# Patient Record
Sex: MALE
[De-identification: names, ages, dates, MRNs, and addresses within clinical notes are randomized per-mention and may not be internally consistent; named-entity substitution may affect disease eponyms.]

---

## 2020-03-11 ENCOUNTER — HOSPITAL ENCOUNTER (OUTPATIENT)
Dept: HOSPITAL 90 - RESP | Age: 70
Discharge: HOME | End: 2020-03-11
Attending: INTERNAL MEDICINE
Payer: COMMERCIAL

## 2020-03-11 DIAGNOSIS — R06.09: Primary | ICD-10-CM

## 2020-03-11 PROCEDURE — 94729 DIFFUSING CAPACITY: CPT

## 2020-03-11 PROCEDURE — 94060 EVALUATION OF WHEEZING: CPT

## 2020-03-11 PROCEDURE — 94727 GAS DIL/WSHOT DETER LNG VOL: CPT

## 2020-04-09 ENCOUNTER — HOSPITAL ENCOUNTER (OUTPATIENT)
Dept: HOSPITAL 90 - OIH | Age: 70
Discharge: HOME | End: 2020-04-09
Attending: INTERNAL MEDICINE
Payer: COMMERCIAL

## 2020-04-09 DIAGNOSIS — Z13.6: Primary | ICD-10-CM

## 2020-04-09 PROCEDURE — 75571 CT HRT W/O DYE W/CA TEST: CPT

## 2022-08-04 ENCOUNTER — HOSPITAL ENCOUNTER (OUTPATIENT)
Dept: HOSPITAL 90 - SHCH | Age: 72
Discharge: HOME | End: 2022-08-04
Attending: INTERNAL MEDICINE
Payer: COMMERCIAL

## 2022-08-04 DIAGNOSIS — I27.0: Primary | ICD-10-CM

## 2022-08-04 PROCEDURE — 93306 TTE W/DOPPLER COMPLETE: CPT

## 2024-11-22 ENCOUNTER — HOSPITAL ENCOUNTER (OUTPATIENT)
Dept: HOSPITAL 90 - RAH | Age: 74
Discharge: HOME | End: 2024-11-22
Attending: OTOLARYNGOLOGY
Payer: COMMERCIAL

## 2024-11-22 DIAGNOSIS — M47.815: ICD-10-CM

## 2024-11-22 DIAGNOSIS — R49.0: Primary | ICD-10-CM

## 2024-11-22 PROCEDURE — 71046 X-RAY EXAM CHEST 2 VIEWS: CPT

## 2024-12-02 ENCOUNTER — HOSPITAL ENCOUNTER (OUTPATIENT)
Dept: HOSPITAL 90 - LAB | Age: 74
Discharge: HOME | End: 2024-12-02
Attending: OTOLARYNGOLOGY
Payer: COMMERCIAL

## 2024-12-02 DIAGNOSIS — R49.0: Primary | ICD-10-CM

## 2024-12-02 LAB
BUN SERPL-MCNC: 27 MG/DL (ref 7–18)
CREAT SERPL-MCNC: 1.2 MG/DL (ref 0.5–1.3)
GFR SERPL CREATININE-BSD FRML MDRD: 63 ML/MIN (ref 90–?)

## 2024-12-02 PROCEDURE — 36415 COLL VENOUS BLD VENIPUNCTURE: CPT

## 2024-12-02 PROCEDURE — 84520 ASSAY OF UREA NITROGEN: CPT

## 2024-12-02 PROCEDURE — 82565 ASSAY OF CREATININE: CPT

## 2024-12-05 ENCOUNTER — HOSPITAL ENCOUNTER (OUTPATIENT)
Dept: HOSPITAL 90 - RAH | Age: 74
Discharge: HOME | End: 2024-12-05
Attending: OTOLARYNGOLOGY
Payer: COMMERCIAL

## 2024-12-05 DIAGNOSIS — G31.89: ICD-10-CM

## 2024-12-05 DIAGNOSIS — R49.0: ICD-10-CM

## 2024-12-05 DIAGNOSIS — E04.1: Primary | ICD-10-CM

## 2024-12-05 PROCEDURE — 70470 CT HEAD/BRAIN W/O & W/DYE: CPT

## 2024-12-05 PROCEDURE — 76536 US EXAM OF HEAD AND NECK: CPT

## 2024-12-05 NOTE — [facility]
CT HEAD/BRAIN W/WO CONTRAST



HISTORY: Voice and resonance disorder



COMPARISON: None



TECHNIQUE: Multiple sequential axial images of the head were obtained

from the base of the skull through vertex. Patient was not given

contrast through intravenous route. 



FINDINGS: The ventricles and extraventricular CSF spaces are dilated

consistent with cerebral atrophy. Nonspecific white matter changes seen.

 There is no midline shift, mass effect or herniation.  No acute

intracranial bleed is seen.  Visualized portion of the paranasal sinuses

are grossly within normal limits.



IMPRESSION: 

1. No acute intracranial bleed is seen.

2. Atrophy with white matter changes.









CT was performed with one or more following dose reduction techniques:

automated exposure control, adjustment of the mA and kv according to

patient's size, or use of a iterative reconstruction technique.

## 2024-12-05 NOTE — [facility]
US THYROID/NECK



HISTORY: Dyspnea



COMPARISON: None



TECHNIQUE: Thyroid ultrasound study was performed.



FINDINGS: Right thyroid lobe measures 4.5 x 1.2 x 2.1 cm. Left thyroid

lobe measures 3.9 x 1.5 x 1.6 cm. No discrete thyroid nodule is seen.

There is left midpole thyroid cyst measuring 4 x 2 x 3 mm.



IMPRESSION: 

1. No discrete thyroid nodule is seen. Left mid pole thyroid cyst

measuring 4 x 2 x 3 mm.

## 2025-01-30 ENCOUNTER — HOSPITAL ENCOUNTER (OUTPATIENT)
Dept: HOSPITAL 90 - LAB | Age: 75
Discharge: HOME | End: 2025-01-30
Attending: OTOLARYNGOLOGY
Payer: COMMERCIAL

## 2025-01-30 DIAGNOSIS — R94.4: ICD-10-CM

## 2025-01-30 DIAGNOSIS — Z13.228: Primary | ICD-10-CM

## 2025-01-30 LAB
BUN SERPL-MCNC: 24 MG/DL (ref 7–18)
CHLORIDE SERPL-SCNC: 103 MMOL/L (ref 101–111)
CO2 SERPL-SCNC: 31 MMOL/L (ref 21–32)
CREAT SERPL-MCNC: 1.3 MG/DL (ref 0.5–1.3)
GFR SERPL CREATININE-BSD FRML MDRD: 57 ML/MIN (ref 90–?)
GLUCOSE SERPL-MCNC: 96 MG/DL (ref 70–105)
POTASSIUM SERPL-SCNC: 4.7 MMOL/L (ref 3.5–5.1)
SODIUM SERPL-SCNC: 140 MMOL/L (ref 136–145)

## 2025-01-30 PROCEDURE — 36415 COLL VENOUS BLD VENIPUNCTURE: CPT

## 2025-01-30 PROCEDURE — 80048 BASIC METABOLIC PNL TOTAL CA: CPT

## 2025-01-31 ENCOUNTER — HOSPITAL ENCOUNTER (OUTPATIENT)
Dept: HOSPITAL 90 - RAH | Age: 75
Discharge: HOME | End: 2025-01-31
Attending: OTOLARYNGOLOGY
Payer: COMMERCIAL

## 2025-01-31 DIAGNOSIS — R22.1: Primary | ICD-10-CM

## 2025-01-31 DIAGNOSIS — M47.812: ICD-10-CM

## 2025-01-31 DIAGNOSIS — K76.89: ICD-10-CM

## 2025-01-31 PROCEDURE — 70491 CT SOFT TISSUE NECK W/DYE: CPT

## 2025-01-31 PROCEDURE — 71260 CT THORAX DX C+: CPT

## 2025-01-31 NOTE — [facility]
CT CHEST W/CONTRAST



REASON:  LOCALIZED SWELLING



COMPARISON: None.



TECHNIQUE:

Multiple sequential axial images of the chest were obtained from the

thoracic inlet through the upper pole of the kidneys following

intravenous contrast administration. IV contrast volume was 75 cc

Omnipaque 350.



FINDINGS:

Lungs are clear. There are no focal masses or infiltrates. There is

normal-appearing pulmonary interstitial pattern. Contrast outlines

normal hilar and mediastinal structures. There is no mass or

lymphadenopathy. Chest wall structures appear normal. There is a 1.9 cm

cyst right lobe of the liver. Visualized portions of the liver appear

otherwise normal as do other upper abdominal structures.



IMPRESSION:



1. Small cyst in the liver.

2. Otherwise negative postcontrast CT chest.



CT was performed with one or more following dose reduction techniques:

automated exposure control, adjustment of the mA and kv according to

patient's size, or use of a iterative reconstruction technique.

## 2025-01-31 NOTE — [facility]
CT NECK SOFT TISS W/CONTRAST



REASON: NECK MASS, LUMP



COMPARISON: None 



TECHNIQUE: Axial images are obtained from skull base through the

thoracic inlet following IV contrast, 75 cc Omnipaque 350. 



FINDINGS: 



Skull base structures are unremarkable. Salivary glands appear normal.

Tongue, tonsillar fossa and parapharyngeal soft tissues appear normal.

Larynx, thyroid and trachea are unremarkable. Lung apices are clear.

There is no cervical lymphadenopathy. There are degenerative changes in

the cervical spine. There are no suspicious focal osseous lesions.



IMPRESSION:

1. Unremarkable postcontrast CT of the cervical soft tissues.